# Patient Record
Sex: MALE | Race: WHITE | NOT HISPANIC OR LATINO | Employment: FULL TIME | URBAN - METROPOLITAN AREA
[De-identification: names, ages, dates, MRNs, and addresses within clinical notes are randomized per-mention and may not be internally consistent; named-entity substitution may affect disease eponyms.]

---

## 2021-06-01 ENCOUNTER — RECORDS - HEALTHEAST (OUTPATIENT)
Dept: ADMINISTRATIVE | Facility: CLINIC | Age: 36
End: 2021-06-01

## 2024-07-23 ENCOUNTER — OFFICE VISIT (OUTPATIENT)
Dept: FAMILY MEDICINE | Facility: CLINIC | Age: 39
End: 2024-07-23
Payer: COMMERCIAL

## 2024-07-23 VITALS
RESPIRATION RATE: 16 BRPM | BODY MASS INDEX: 30.35 KG/M2 | DIASTOLIC BLOOD PRESSURE: 75 MMHG | WEIGHT: 212 LBS | OXYGEN SATURATION: 100 % | TEMPERATURE: 97.9 F | HEIGHT: 70 IN | SYSTOLIC BLOOD PRESSURE: 123 MMHG | HEART RATE: 77 BPM

## 2024-07-23 DIAGNOSIS — B36.9 FUNGAL DERMATITIS: Primary | ICD-10-CM

## 2024-07-23 DIAGNOSIS — Z28.21 IMMUNIZATION DECLINED: ICD-10-CM

## 2024-07-23 PROBLEM — F17.211 NICOTINE DEPENDENCE, CIGARETTES, IN REMISSION: Status: ACTIVE | Noted: 2024-07-23

## 2024-07-23 PROCEDURE — 99203 OFFICE O/P NEW LOW 30 MIN: CPT

## 2024-07-23 RX ORDER — KETOCONAZOLE 20 MG/ML
SHAMPOO TOPICAL DAILY PRN
Qty: 100 ML | Refills: 0 | Status: SHIPPED | OUTPATIENT
Start: 2024-07-23

## 2024-07-23 RX ORDER — NYSTATIN 100000 U/G
CREAM TOPICAL DAILY PRN
COMMUNITY
Start: 2023-09-16 | End: 2024-09-10

## 2024-07-23 RX ORDER — KETOCONAZOLE 20 MG/G
CREAM TOPICAL DAILY
COMMUNITY
Start: 2023-08-22 | End: 2024-09-10

## 2024-07-23 ASSESSMENT — PAIN SCALES - GENERAL: PAINLEVEL: NO PAIN (0)

## 2024-07-23 NOTE — PROGRESS NOTES
"  Assessment & Plan     Fungal dermatitis  - reviewed ddx with pt  - since antifungal worked previously, will restart:  - ketoconazole (NIZORAL) 2 % external shampoo  Dispense: 100 mL; Refill: 0  - The uses and side effects, including black box warnings as appropriate, were discussed in detail.  All patient questions were answered.  The patient was instructed to call immediately if any side effects developed.  - reviewed worrisome sxs to watch for, advised to RTC prn if sxs persist or worsen   - consider further workup for psoriasis vs other dermatitis if sxs persist/worsen   - REVIEW OF HEALTH MAINTENANCE PROTOCOL ORDERS  - PRIMARY CARE FOLLOW-UP SCHEDULING    Immunization declined  - declines covid19 vaccine    BMI  Estimated body mass index is 30.42 kg/m  as calculated from the following:    Height as of this encounter: 1.778 m (5' 10\").    Weight as of this encounter: 96.2 kg (212 lb).   Weight management plan: Discussed healthy diet and exercise guidelines    RTC for WME, prn sooner. The patient verbalized understanding and agreement with the plan today and has no additional questions or concerns at this time.    Keira De La Cruz is a 38 year old, presenting for the following health issues:  Derm Problem        7/23/2024     1:45 PM   Additional Questions   Roomed by natanael   Accompanied by self     History of Present Illness       Reason for visit:  Rash in groin area and around butt  Symptom onset:  More than a month  Symptoms include:  Itchy rash.  hard to sleep  Symptom intensity:  Moderate  Symptom progression:  Staying the same  Had these symptoms before:  No  What makes it worse:  At night  What makes it better:  Hydrocortisone    He eats 4 or more servings of fruits and vegetables daily.He consumes 1 sweetened beverage(s) daily.He exercises with enough effort to increase his heart rate 60 or more minutes per day.  He exercises with enough effort to increase his heart rate 5 days per week.   He is " "taking medications regularly.     Rash  Onset/Duration: happened last year, this year it started 3 months ago   Description  Location: on the bottom   Character: raised, red, blotches   Itching: severe, at night it interrupts sleep   Intensity:  moderate  Progression of Symptoms:  constant  Accompanying signs and symptoms:   Fever: No  Body aches or joint pain: No  Sore throat symptoms: No  Recent cold symptoms: No  History:           Previous episodes of similar rash: yes, last year this has happened and he got specific shampoo for an antifungal infection and it cleared up,   New exposures:  None  Recent travel: No  Exposure to similar rash: YES- last year same thing happened   Precipitating or alleviating factors: patient was prescribed antifungal shampoo and he stated that it helped, he uses hydrocortisone at night to try and relieve symptoms   Therapies tried and outcome: hydrocortisone cream -  not effective    Reports history of using ketoconazole shampoo was significantly helpful in the past. Works out a lot, uses the sauna. Has 2 young children as well and is outside. Was prescribed creams but the shampoo was actually helpful. Reports being better at actually using the shampoo because he would do it in the shower. He used his sister-in-law's prescriptions. It took a few weeks to go away last time.     No fevers or chills. Appeared again a few months ago, slowly getting worse.     Review of Systems  Constitutional, HEENT, cardiovascular, pulmonary, GI, , musculoskeletal, neuro, skin, endocrine and psych systems are negative, except as otherwise noted.      Objective    /75 (BP Location: Left arm, Patient Position: Sitting, Cuff Size: Adult Large)   Pulse 77   Temp 97.9  F (36.6  C) (Oral)   Resp 16   Ht 1.778 m (5' 10\")   Wt 96.2 kg (212 lb)   SpO2 100%   BMI 30.42 kg/m    Body mass index is 30.42 kg/m .  Physical Exam   GENERAL: alert and no distress  EYES: Eyes grossly normal to inspection, " conjunctivae and sclerae normal  RESP: lungs clear to auscultation - no rales, rhonchi or wheezes  CV: regular rate and rhythm, normal S1 S2, no S3 or S4, no murmur, click or rub, no peripheral edema   (male): gluteal crease +erythema and raised, erythematous papules. +slight scale in inner part of cleft. No open lesions  MS: no gross musculoskeletal defects noted, no edema  NEURO: Normal strength and tone, mentation intact and speech normal  PSYCH: mentation appears normal, affect normal/bright    Signed Electronically by: JOHN Aragon CNP

## 2024-07-27 ENCOUNTER — HEALTH MAINTENANCE LETTER (OUTPATIENT)
Age: 39
End: 2024-07-27

## 2024-08-07 ENCOUNTER — TELEPHONE (OUTPATIENT)
Dept: DERMATOLOGY | Facility: CLINIC | Age: 39
End: 2024-08-07
Payer: COMMERCIAL

## 2024-08-07 NOTE — TELEPHONE ENCOUNTER
[facility] Call Center    Phone Message    May a detailed message be left on voicemail: yes     Reason for Call: Other: Please call pt to discuss the changing moles/lesions on his face and back. They are growing in size/color. Pt does not have a referral      Action Taken: TE SENT    Travel Screening: Not Applicable     Date of Service:                   Thank you!  [facility]

## 2024-09-10 ENCOUNTER — OFFICE VISIT (OUTPATIENT)
Dept: FAMILY MEDICINE | Facility: CLINIC | Age: 39
End: 2024-09-10
Payer: COMMERCIAL

## 2024-09-10 VITALS
OXYGEN SATURATION: 99 % | TEMPERATURE: 99 F | HEIGHT: 70 IN | SYSTOLIC BLOOD PRESSURE: 110 MMHG | DIASTOLIC BLOOD PRESSURE: 78 MMHG | RESPIRATION RATE: 18 BRPM | HEART RATE: 85 BPM | BODY MASS INDEX: 31.07 KG/M2 | WEIGHT: 217 LBS

## 2024-09-10 DIAGNOSIS — B35.6 TINEA CRURIS: ICD-10-CM

## 2024-09-10 DIAGNOSIS — Z23 IMMUNIZATION DUE: ICD-10-CM

## 2024-09-10 DIAGNOSIS — F41.1 GAD (GENERALIZED ANXIETY DISORDER): ICD-10-CM

## 2024-09-10 DIAGNOSIS — Z00.00 ANNUAL PHYSICAL EXAM: Primary | ICD-10-CM

## 2024-09-10 PROBLEM — F17.211 NICOTINE DEPENDENCE, CIGARETTES, IN REMISSION: Status: RESOLVED | Noted: 2024-07-23 | Resolved: 2024-09-10

## 2024-09-10 LAB
BASOPHILS # BLD AUTO: 0 10E3/UL (ref 0–0.2)
BASOPHILS NFR BLD AUTO: 0 %
EOSINOPHIL # BLD AUTO: 0 10E3/UL (ref 0–0.7)
EOSINOPHIL NFR BLD AUTO: 0 %
ERYTHROCYTE [DISTWIDTH] IN BLOOD BY AUTOMATED COUNT: 12.5 % (ref 10–15)
HBA1C MFR BLD: 5 % (ref 0–5.6)
HCT VFR BLD AUTO: 41.2 % (ref 40–53)
HGB BLD-MCNC: 14.9 G/DL (ref 13.3–17.7)
IMM GRANULOCYTES # BLD: 0 10E3/UL
IMM GRANULOCYTES NFR BLD: 0 %
LYMPHOCYTES # BLD AUTO: 2 10E3/UL (ref 0.8–5.3)
LYMPHOCYTES NFR BLD AUTO: 18 %
MCH RBC QN AUTO: 30 PG (ref 26.5–33)
MCHC RBC AUTO-ENTMCNC: 36.2 G/DL (ref 31.5–36.5)
MCV RBC AUTO: 83 FL (ref 78–100)
MONOCYTES # BLD AUTO: 0.7 10E3/UL (ref 0–1.3)
MONOCYTES NFR BLD AUTO: 6 %
NEUTROPHILS # BLD AUTO: 8.4 10E3/UL (ref 1.6–8.3)
NEUTROPHILS NFR BLD AUTO: 75 %
PLATELET # BLD AUTO: 287 10E3/UL (ref 150–450)
RBC # BLD AUTO: 4.97 10E6/UL (ref 4.4–5.9)
WBC # BLD AUTO: 11.3 10E3/UL (ref 4–11)

## 2024-09-10 PROCEDURE — 90471 IMMUNIZATION ADMIN: CPT | Performed by: FAMILY MEDICINE

## 2024-09-10 PROCEDURE — 36415 COLL VENOUS BLD VENIPUNCTURE: CPT | Performed by: FAMILY MEDICINE

## 2024-09-10 PROCEDURE — 80061 LIPID PANEL: CPT | Performed by: FAMILY MEDICINE

## 2024-09-10 PROCEDURE — 84443 ASSAY THYROID STIM HORMONE: CPT | Performed by: FAMILY MEDICINE

## 2024-09-10 PROCEDURE — 83036 HEMOGLOBIN GLYCOSYLATED A1C: CPT | Performed by: FAMILY MEDICINE

## 2024-09-10 PROCEDURE — 99395 PREV VISIT EST AGE 18-39: CPT | Mod: 25 | Performed by: FAMILY MEDICINE

## 2024-09-10 PROCEDURE — 80053 COMPREHEN METABOLIC PANEL: CPT | Performed by: FAMILY MEDICINE

## 2024-09-10 PROCEDURE — 85025 COMPLETE CBC W/AUTO DIFF WBC: CPT | Performed by: FAMILY MEDICINE

## 2024-09-10 PROCEDURE — 99214 OFFICE O/P EST MOD 30 MIN: CPT | Mod: 25 | Performed by: FAMILY MEDICINE

## 2024-09-10 PROCEDURE — 90656 IIV3 VACC NO PRSV 0.5 ML IM: CPT | Performed by: FAMILY MEDICINE

## 2024-09-10 RX ORDER — ESCITALOPRAM OXALATE 10 MG/1
10 TABLET ORAL DAILY
Qty: 30 TABLET | Refills: 5 | Status: SHIPPED | OUTPATIENT
Start: 2024-09-10

## 2024-09-11 LAB
ALBUMIN SERPL BCG-MCNC: 4.9 G/DL (ref 3.5–5.2)
ALP SERPL-CCNC: 45 U/L (ref 40–150)
ALT SERPL W P-5'-P-CCNC: 17 U/L (ref 0–70)
ANION GAP SERPL CALCULATED.3IONS-SCNC: 13 MMOL/L (ref 7–15)
AST SERPL W P-5'-P-CCNC: 24 U/L (ref 0–45)
BILIRUB SERPL-MCNC: 0.9 MG/DL
BUN SERPL-MCNC: 14.3 MG/DL (ref 6–20)
CALCIUM SERPL-MCNC: 9.8 MG/DL (ref 8.8–10.4)
CHLORIDE SERPL-SCNC: 99 MMOL/L (ref 98–107)
CHOLEST SERPL-MCNC: 179 MG/DL
CREAT SERPL-MCNC: 1 MG/DL (ref 0.67–1.17)
EGFRCR SERPLBLD CKD-EPI 2021: >90 ML/MIN/1.73M2
FASTING STATUS PATIENT QL REPORTED: ABNORMAL
FASTING STATUS PATIENT QL REPORTED: NORMAL
GLUCOSE SERPL-MCNC: 93 MG/DL (ref 70–99)
HCO3 SERPL-SCNC: 28 MMOL/L (ref 22–29)
HDLC SERPL-MCNC: 62 MG/DL
LDLC SERPL CALC-MCNC: 105 MG/DL
NONHDLC SERPL-MCNC: 117 MG/DL
POTASSIUM SERPL-SCNC: 3.7 MMOL/L (ref 3.4–5.3)
PROT SERPL-MCNC: 7.7 G/DL (ref 6.4–8.3)
SODIUM SERPL-SCNC: 140 MMOL/L (ref 135–145)
TRIGL SERPL-MCNC: 58 MG/DL
TSH SERPL DL<=0.005 MIU/L-ACNC: 1.1 UIU/ML (ref 0.3–4.2)

## 2024-09-12 PROBLEM — D72.828 NEUTROPHILIA: Status: ACTIVE | Noted: 2024-09-12

## 2024-10-08 ENCOUNTER — OFFICE VISIT (OUTPATIENT)
Dept: DERMATOLOGY | Facility: CLINIC | Age: 39
End: 2024-10-08
Payer: COMMERCIAL

## 2024-10-08 DIAGNOSIS — L20.84 INTRINSIC ATOPIC DERMATITIS: Primary | ICD-10-CM

## 2024-10-08 DIAGNOSIS — D22.9 MULTIPLE BENIGN MELANOCYTIC NEVI: ICD-10-CM

## 2024-10-08 DIAGNOSIS — L30.4 INTERTRIGO: ICD-10-CM

## 2024-10-08 DIAGNOSIS — L20.84 INTRINSIC ATOPIC DERMATITIS: ICD-10-CM

## 2024-10-08 PROCEDURE — 99204 OFFICE O/P NEW MOD 45 MIN: CPT | Mod: GC | Performed by: STUDENT IN AN ORGANIZED HEALTH CARE EDUCATION/TRAINING PROGRAM

## 2024-10-08 RX ORDER — TACROLIMUS 1 MG/G
OINTMENT TOPICAL 2 TIMES DAILY
Qty: 60 G | Refills: 4 | Status: SHIPPED | OUTPATIENT
Start: 2024-10-08

## 2024-10-08 ASSESSMENT — PAIN SCALES - GENERAL: PAINLEVEL: NO PAIN (0)

## 2024-10-08 NOTE — PROGRESS NOTES
I have personally examined this patient and agree with the resident doctor's documentation and plan of care. I have reviewed and amended the resident's note. The documentation accurately reflects my clinical observations, diagnoses, treatment and follow-up plans.     Maximo Ramirez MD  Dermatology Staff      [facility] Dermatology Note  Encounter Date: Oct 8, 2024  Office Visit     Dermatology Problem List:  1. Intertrigo with component of atopic dermatitis  - Inverse psoriasis versus irritant contact dermatitis, bilateral inguinal folds and gluteal cleft  - Tacrolimus ointment BID   2. Dermal nevus, R nasolabial fold  ____________________________________________    Assessment & Plan:     Intertrigo with component of atopic dermatitis  DDX includes inverse psoriasis versus irritant contact dermatitis, bilateral inguinal folds and gluteal cleft. Component of atopic dermatitis as well.   - Start Tacrolimus ointment BID     2. Dermal nevus, R nasolabial fold  - Provided reassurance    3.  Benign melanocytic nevi, chest and back  Discussed etiology and natural history of condition. Reassured of benign etiology.    Procedures Performed: Non    Staff and Resident:    Annie Reyes MD (PGY-4)  Dermatology Resident       ____________________________________________    CC: Derm Problem (Here today for a spot check on lip and back.)    HPI:  Mr. Jono Sanchez is a(n) 38 year old male who presents today as a new patient for spot check on a spot on his cheek that has been present since he was a kid, but he thinks it might be getting a little more raised. Also has a rash in the groin and buttock area. Initially ketokonazole improved this but it has been resistant. Would also like a mole check of his chest and back.    Patient is otherwise feeling well, without additional skin concerns.    Labs Reviewed:  N/A    Physical Exam:  Vitals: There were no vitals taken for this visit.   Focused exam of the  following area trunk genitals and inguinal folds as well asd gluteal cleft     - Light pink patches with overlying scale, bilateral inguinal folds  - Light pink patch with few light pink papules along the gluteal cleft  - Skin colored papule on the R nasolabial fold with regular pigment dots and comma shaped vessels   - Multiple regular brown pigmented macules and papules are identified on the chest and back    Medications:  Current Outpatient Medications   Medication Sig Dispense Refill    escitalopram (LEXAPRO) 10 MG tablet Take 1 tablet (10 mg) by mouth daily. 30 tablet 5    ketoconazole (NIZORAL) 2 % external shampoo Apply topically daily as needed for itching or irritation 100 mL 0     No current facility-administered medications for this visit.      Past Medical History:   Patient Active Problem List   Diagnosis    Tinea cruris    ANGELLA (generalized anxiety disorder)    Neutrophilia     No past medical history on file.    CC Tato Rizvi MD  [address] [place] [address] on close of this encounter.

## 2024-10-08 NOTE — LETTER
10/8/2024       RE: Jono Sanchez  [address] [facility] [address]     Dear Colleague,    Thank you for referring your patient, Jono Sanchez, to the [facility] [place] [facility]. Please see a copy of my visit note below.    I have personally examined this patient and agree with the resident doctor's documentation and plan of care. I have reviewed and amended the resident's note. The documentation accurately reflects my clinical observations, diagnoses, treatment and follow-up plans.     Maximo Ramirez MD  Dermatology Staff      [facility] Dermatology Note  Encounter Date: Oct 8, 2024  Office Visit     Dermatology Problem List:  1. Intertrigo with component of atopic dermatitis  - Inverse psoriasis versus irritant contact dermatitis, bilateral inguinal folds and gluteal cleft  - Tacrolimus ointment BID   2. Dermal nevus, R nasolabial fold  ____________________________________________    Assessment & Plan:     Intertrigo with component of atopic dermatitis  DDX includes inverse psoriasis versus irritant contact dermatitis, bilateral inguinal folds and gluteal cleft. Component of atopic dermatitis as well.   - Start Tacrolimus ointment BID     2. Dermal nevus, R nasolabial fold  - Provided reassurance    3.  Benign melanocytic nevi, chest and back  Discussed etiology and natural history of condition. Reassured of benign etiology.    Procedures Performed: Non    Staff and Resident:    Annie Reyes MD (PGY-4)  Dermatology Resident       ____________________________________________    CC: Derm Problem (Here today for a spot check on lip and back.)    HPI:  Mr. Jono Sanchez is a(n) 38 year old male who presents today as a new patient for spot check on a spot on his cheek that has been present since he was a kid, but he thinks it might be getting a little more raised. Also has a rash in the groin and  buttock area. Initially ketokonazole improved this but it has been resistant. Would also like a mole check of his chest and back.    Patient is otherwise feeling well, without additional skin concerns.    Labs Reviewed:  N/A    Physical Exam:  Vitals: There were no vitals taken for this visit.   Focused exam of the following area trunk genitals and inguinal folds as well asd gluteal cleft     - Light pink patches with overlying scale, bilateral inguinal folds  - Light pink patch with few light pink papules along the gluteal cleft  - Skin colored papule on the R nasolabial fold with regular pigment dots and comma shaped vessels   - Multiple regular brown pigmented macules and papules are identified on the chest and back    Medications:  Current Outpatient Medications   Medication Sig Dispense Refill     escitalopram (LEXAPRO) 10 MG tablet Take 1 tablet (10 mg) by mouth daily. 30 tablet 5     ketoconazole (NIZORAL) 2 % external shampoo Apply topically daily as needed for itching or irritation 100 mL 0     No current facility-administered medications for this visit.      Past Medical History:   Patient Active Problem List   Diagnosis     Tinea cruris     ANGELLA (generalized anxiety disorder)     Neutrophilia     No past medical history on file.    CC Tato Rizvi MD  [address] [place] [address] on close of this encounter.       Again, thank you for allowing me to participate in the care of your patient.      Sincerely,    Maximo Ramirez MD

## 2024-10-08 NOTE — NURSING NOTE
Dermatology Rooming Note    Jono Sanchez's goals for this visit include:   Chief Complaint   Patient presents with    Derm Problem     Here today for a spot check on lip and back.     Marian Hollins RN

## 2024-10-09 NOTE — TELEPHONE ENCOUNTER
tacrolimus (PROTOPIC) 0.1 % external ointment 60 g 4 10/8/2024 -- No   Sig - Route: Apply topically 2 times daily. - Topical   Sent to pharmacy as: Tacrolimus 0.1 % External Ointment (PROTOPIC)   Class: E-Prescribe   Notes to Pharmacy: 60 g for 30 day supply   Order: [id number]   E-Prescribing Status: Receipt confirmed by pharmacy (10/8/2024  4:44 PM CDT)   Prior authorization: Approved     Pharmacy    [facility] [id number] [place] [address]     Associated Diagnoses    Intrinsic atopic dermatitis [L20.84]  - Primary         Last visit 10/8/24  Routing refill request to provider for review/approval because:  Medication prescribed 10/8/24 at clinic appt:  Pharmacy comment: Alternative Requested:INSURANCE REQUESTING STEP THERAPY, ATTEMPT OTHER THERAPY BEFORE THIS MEDICATION.

## 2024-10-10 NOTE — TELEPHONE ENCOUNTER
Appears electronic PA was approved, see PA details in prescription order. Called pharmacy to check if they were able to fill this for patient. Pharmacy states patient picked this medication (tacrolimus ointment) up yesterday and state nothing further is needed from clinic at this time.    Matteo Thurman, EMT

## 2024-10-11 RX ORDER — TACROLIMUS 1 MG/G
OINTMENT TOPICAL 2 TIMES DAILY
Qty: 60 G | Refills: 4 | OUTPATIENT
Start: 2024-10-11

## 2024-10-15 ENCOUNTER — OFFICE VISIT (OUTPATIENT)
Dept: FAMILY MEDICINE | Facility: CLINIC | Age: 39
End: 2024-10-15
Payer: COMMERCIAL

## 2024-10-15 VITALS
TEMPERATURE: 98.7 F | DIASTOLIC BLOOD PRESSURE: 77 MMHG | SYSTOLIC BLOOD PRESSURE: 124 MMHG | RESPIRATION RATE: 18 BRPM | BODY MASS INDEX: 31.51 KG/M2 | OXYGEN SATURATION: 99 % | WEIGHT: 225.1 LBS | HEIGHT: 71 IN | HEART RATE: 69 BPM

## 2024-10-15 DIAGNOSIS — Z23 IMMUNIZATION DUE: ICD-10-CM

## 2024-10-15 DIAGNOSIS — F41.1 GAD (GENERALIZED ANXIETY DISORDER): Primary | ICD-10-CM

## 2024-10-15 DIAGNOSIS — D72.828 NEUTROPHILIA: ICD-10-CM

## 2024-10-15 PROBLEM — L20.89 OTHER ATOPIC DERMATITIS: Status: ACTIVE | Noted: 2024-10-15

## 2024-10-15 PROBLEM — B35.6 TINEA CRURIS: Status: RESOLVED | Noted: 2024-09-10 | Resolved: 2024-10-15

## 2024-10-15 LAB
BASOPHILS # BLD AUTO: 0 10E3/UL (ref 0–0.2)
BASOPHILS NFR BLD AUTO: 0 %
EOSINOPHIL # BLD AUTO: 0.1 10E3/UL (ref 0–0.7)
EOSINOPHIL NFR BLD AUTO: 1 %
ERYTHROCYTE [DISTWIDTH] IN BLOOD BY AUTOMATED COUNT: 12.3 % (ref 10–15)
HCT VFR BLD AUTO: 41 % (ref 40–53)
HGB BLD-MCNC: 14.8 G/DL (ref 13.3–17.7)
IMM GRANULOCYTES # BLD: 0 10E3/UL
IMM GRANULOCYTES NFR BLD: 0 %
LYMPHOCYTES # BLD AUTO: 2 10E3/UL (ref 0.8–5.3)
LYMPHOCYTES NFR BLD AUTO: 26 %
MCH RBC QN AUTO: 30.1 PG (ref 26.5–33)
MCHC RBC AUTO-ENTMCNC: 36.1 G/DL (ref 31.5–36.5)
MCV RBC AUTO: 84 FL (ref 78–100)
MONOCYTES # BLD AUTO: 0.7 10E3/UL (ref 0–1.3)
MONOCYTES NFR BLD AUTO: 8 %
NEUTROPHILS # BLD AUTO: 5 10E3/UL (ref 1.6–8.3)
NEUTROPHILS NFR BLD AUTO: 65 %
PLATELET # BLD AUTO: 272 10E3/UL (ref 150–450)
RBC # BLD AUTO: 4.91 10E6/UL (ref 4.4–5.9)
WBC # BLD AUTO: 7.8 10E3/UL (ref 4–11)

## 2024-10-15 PROCEDURE — 91320 SARSCV2 VAC 30MCG TRS-SUC IM: CPT | Performed by: FAMILY MEDICINE

## 2024-10-15 PROCEDURE — 90480 ADMN SARSCOV2 VAC 1/ONLY CMP: CPT | Performed by: FAMILY MEDICINE

## 2024-10-15 PROCEDURE — G2211 COMPLEX E/M VISIT ADD ON: HCPCS | Performed by: FAMILY MEDICINE

## 2024-10-15 PROCEDURE — 36415 COLL VENOUS BLD VENIPUNCTURE: CPT | Performed by: FAMILY MEDICINE

## 2024-10-15 PROCEDURE — 85025 COMPLETE CBC W/AUTO DIFF WBC: CPT | Performed by: FAMILY MEDICINE

## 2024-10-15 PROCEDURE — 99214 OFFICE O/P EST MOD 30 MIN: CPT | Performed by: FAMILY MEDICINE

## 2024-10-15 RX ORDER — ESCITALOPRAM OXALATE 10 MG/1
10 TABLET ORAL DAILY
Qty: 90 TABLET | Refills: 3 | Status: SHIPPED | OUTPATIENT
Start: 2024-10-15

## 2024-10-15 ASSESSMENT — ANXIETY QUESTIONNAIRES
3. WORRYING TOO MUCH ABOUT DIFFERENT THINGS: SEVERAL DAYS
GAD7 TOTAL SCORE: 5
4. TROUBLE RELAXING: SEVERAL DAYS
7. FEELING AFRAID AS IF SOMETHING AWFUL MIGHT HAPPEN: NOT AT ALL
2. NOT BEING ABLE TO STOP OR CONTROL WORRYING: SEVERAL DAYS
GAD7 TOTAL SCORE: 5
GAD7 TOTAL SCORE: 5
1. FEELING NERVOUS, ANXIOUS, OR ON EDGE: SEVERAL DAYS
8. IF YOU CHECKED OFF ANY PROBLEMS, HOW DIFFICULT HAVE THESE MADE IT FOR YOU TO DO YOUR WORK, TAKE CARE OF THINGS AT HOME, OR GET ALONG WITH OTHER PEOPLE?: NOT DIFFICULT AT ALL
6. BECOMING EASILY ANNOYED OR IRRITABLE: NOT AT ALL
5. BEING SO RESTLESS THAT IT IS HARD TO SIT STILL: SEVERAL DAYS
IF YOU CHECKED OFF ANY PROBLEMS ON THIS QUESTIONNAIRE, HOW DIFFICULT HAVE THESE PROBLEMS MADE IT FOR YOU TO DO YOUR WORK, TAKE CARE OF THINGS AT HOME, OR GET ALONG WITH OTHER PEOPLE: NOT DIFFICULT AT ALL
7. FEELING AFRAID AS IF SOMETHING AWFUL MIGHT HAPPEN: NOT AT ALL

## 2024-10-15 NOTE — PROGRESS NOTES
Assessment & Plan     ANGELLA (generalized anxiety disorder)  Controlled. Continue Lexapro 10 mg daily. 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  - escitalopram (LEXAPRO) 10 MG tablet; Take 1 tablet (10 mg) by mouth daily.    Neutrophilia  Check labs, could have been related to hydrocortisone cream. Notify him with results.  - CBC with Platelets & Differential; Future    Immunization due  - COVID-19 12+ (PFIZER)      The longitudinal plan of care for the diagnosis(es)/condition(s) as documented were addressed during this visit. Due to the added complexity in care, I will continue to support Jono in the subsequent management and with ongoing continuity of care.            Subjective   Jono is a 38 year old, presenting for the following health issues:  Follow Up (Patient is here for a follow up.), Medication Follow-up (Patient has been taking escitalopram and is working okay. ), and Blood Draw (Not fasting.)          10/15/2024    10:31 AM   Additional Questions   Roomed by hussien contreras cma   Accompanied by self       History of Present Illness       Mental Health Follow-up:  Patient presents to follow-up on Anxiety.    Patient's anxiety since last visit has been:  Good  The patient is not having other symptoms associated with anxiety.  Any significant life events: No  Patient is not feeling anxious or having panic attacks.  Patient has no concerns about alcohol or drug use.    Reason for visit:  Follow up from last appointment    He eats 4 or more servings of fruits and vegetables daily.He consumes 2 sweetened beverage(s) daily.He exercises with enough effort to increase his heart rate 60 or more minutes per day.  He exercises with enough effort to increase his heart rate 5 days per week.   He is taking medications regularly.     ANGELLA: He has been taking Lexapro 10 mg daily for the last 1 month. Less concern about financial situation, which is  "doing well. 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 He is waking up happier.  He has gained 8 pounds in the last 1 month, but he is not going through the gym.    Neutrophilia: 1 month ago, WBC 11.3 and neutrophil 8.4. He was using significant amounts of hydrocortisone topical.               Review of Systems  Constitutional, HEENT, cardiovascular, pulmonary, gi and gu systems are negative, except as otherwise noted.      Objective    /77 (BP Location: Left arm, Patient Position: Sitting, Cuff Size: Adult Large)   Pulse 69   Temp 98.7  F (37.1  C) (Oral)   Resp 18   Ht 1.791 m (5' 10.51\")   Wt 102.1 kg (225 lb 1.6 oz)   SpO2 99%   BMI 31.83 kg/m    Body mass index is 31.83 kg/m .  Physical Exam   GENERAL: alert and no distress  PSYCH: mentation appears normal, affect normal/bright            Signed Electronically by: Tato Rizvi MD    "

## 2025-08-11 ENCOUNTER — PATIENT OUTREACH (OUTPATIENT)
Dept: CARE COORDINATION | Facility: CLINIC | Age: 40
End: 2025-08-11
Payer: COMMERCIAL

## (undated) RX ORDER — LIDOCAINE HYDROCHLORIDE AND EPINEPHRINE 10; 10 MG/ML; UG/ML
INJECTION, SOLUTION INFILTRATION; PERINEURAL
Status: DISPENSED
Start: 2024-10-08